# Patient Record
Sex: FEMALE | Race: WHITE
[De-identification: names, ages, dates, MRNs, and addresses within clinical notes are randomized per-mention and may not be internally consistent; named-entity substitution may affect disease eponyms.]

---

## 2019-01-26 ENCOUNTER — HOSPITAL ENCOUNTER (INPATIENT)
Dept: HOSPITAL 96 - M.ERS | Age: 44
LOS: 3 days | Discharge: HOME | DRG: 246 | End: 2019-01-29
Attending: INTERNAL MEDICINE | Admitting: INTERNAL MEDICINE
Payer: COMMERCIAL

## 2019-01-26 VITALS — DIASTOLIC BLOOD PRESSURE: 68 MMHG | SYSTOLIC BLOOD PRESSURE: 112 MMHG

## 2019-01-26 VITALS — SYSTOLIC BLOOD PRESSURE: 110 MMHG | DIASTOLIC BLOOD PRESSURE: 61 MMHG

## 2019-01-26 VITALS — DIASTOLIC BLOOD PRESSURE: 63 MMHG | SYSTOLIC BLOOD PRESSURE: 117 MMHG

## 2019-01-26 VITALS — DIASTOLIC BLOOD PRESSURE: 67 MMHG | SYSTOLIC BLOOD PRESSURE: 106 MMHG

## 2019-01-26 VITALS — SYSTOLIC BLOOD PRESSURE: 163 MMHG | DIASTOLIC BLOOD PRESSURE: 94 MMHG

## 2019-01-26 VITALS — DIASTOLIC BLOOD PRESSURE: 65 MMHG | SYSTOLIC BLOOD PRESSURE: 120 MMHG

## 2019-01-26 VITALS — SYSTOLIC BLOOD PRESSURE: 124 MMHG | DIASTOLIC BLOOD PRESSURE: 74 MMHG

## 2019-01-26 VITALS — SYSTOLIC BLOOD PRESSURE: 116 MMHG | DIASTOLIC BLOOD PRESSURE: 63 MMHG

## 2019-01-26 VITALS — DIASTOLIC BLOOD PRESSURE: 70 MMHG | SYSTOLIC BLOOD PRESSURE: 120 MMHG

## 2019-01-26 VITALS — DIASTOLIC BLOOD PRESSURE: 61 MMHG | SYSTOLIC BLOOD PRESSURE: 107 MMHG

## 2019-01-26 VITALS — DIASTOLIC BLOOD PRESSURE: 55 MMHG | SYSTOLIC BLOOD PRESSURE: 109 MMHG

## 2019-01-26 VITALS — WEIGHT: 204 LBS | BODY MASS INDEX: 38.51 KG/M2 | HEIGHT: 61 IN

## 2019-01-26 VITALS — DIASTOLIC BLOOD PRESSURE: 76 MMHG | SYSTOLIC BLOOD PRESSURE: 132 MMHG

## 2019-01-26 DIAGNOSIS — E03.9: ICD-10-CM

## 2019-01-26 DIAGNOSIS — M54.9: ICD-10-CM

## 2019-01-26 DIAGNOSIS — G89.29: ICD-10-CM

## 2019-01-26 DIAGNOSIS — I11.0: ICD-10-CM

## 2019-01-26 DIAGNOSIS — F41.1: ICD-10-CM

## 2019-01-26 DIAGNOSIS — F32.9: ICD-10-CM

## 2019-01-26 DIAGNOSIS — E66.9: ICD-10-CM

## 2019-01-26 DIAGNOSIS — Z90.49: ICD-10-CM

## 2019-01-26 DIAGNOSIS — I25.10: ICD-10-CM

## 2019-01-26 DIAGNOSIS — I21.4: Primary | ICD-10-CM

## 2019-01-26 DIAGNOSIS — I50.31: ICD-10-CM

## 2019-01-26 DIAGNOSIS — E11.9: ICD-10-CM

## 2019-01-26 DIAGNOSIS — Z71.6: ICD-10-CM

## 2019-01-26 DIAGNOSIS — E78.5: ICD-10-CM

## 2019-01-26 DIAGNOSIS — F17.210: ICD-10-CM

## 2019-01-26 DIAGNOSIS — Z82.49: ICD-10-CM

## 2019-01-26 DIAGNOSIS — Z79.899: ICD-10-CM

## 2019-01-26 LAB
ABSOLUTE BASOPHILS: 0.2 THOU/UL (ref 0–0.2)
ABSOLUTE EOSINOPHILS: 0.4 THOU/UL (ref 0–0.7)
ABSOLUTE MONOCYTES: 1.9 THOU/UL (ref 0–1.2)
ALBUMIN SERPL-MCNC: 3.7 G/DL (ref 3.4–5)
ALP SERPL-CCNC: 62 U/L (ref 46–116)
ALT SERPL-CCNC: 26 U/L (ref 30–65)
ANION GAP SERPL CALC-SCNC: 8 MMOL/L (ref 7–16)
AST SERPL-CCNC: 14 U/L (ref 15–37)
BASOPHILS NFR BLD AUTO: 1.1 %
BILIRUB SERPL-MCNC: 0.2 MG/DL
BUN SERPL-MCNC: 9 MG/DL (ref 7–18)
CALCIUM SERPL-MCNC: 9.4 MG/DL (ref 8.5–10.1)
CHLORIDE SERPL-SCNC: 101 MMOL/L (ref 98–107)
CO2 SERPL-SCNC: 26 MMOL/L (ref 21–32)
CREAT SERPL-MCNC: 0.7 MG/DL (ref 0.6–1.3)
EOSINOPHIL NFR BLD: 2.5 %
GLUCOSE SERPL-MCNC: 123 MG/DL (ref 70–99)
GRANULOCYTES NFR BLD MANUAL: 43.3 %
HCT VFR BLD CALC: 43 % (ref 37–47)
HGB BLD-MCNC: 14.8 GM/DL (ref 12–15)
INR PPP: 0.9
LIPASE: 152 U/L (ref 73–393)
LYMPHOCYTES # BLD: 7.7 THOU/UL (ref 0.8–5.3)
LYMPHOCYTES NFR BLD AUTO: 42.6 %
MCH RBC QN AUTO: 30.7 PG (ref 26–34)
MCHC RBC AUTO-ENTMCNC: 34.3 G/DL (ref 28–37)
MCV RBC: 89.5 FL (ref 80–100)
MONOCYTES NFR BLD: 10.5 %
MPV: 7.9 FL. (ref 7.2–11.1)
NEUTROPHILS # BLD: 7.8 THOU/UL (ref 1.6–8.1)
NT-PRO BRAIN NAT PEPTIDE: 21 PG/ML (ref ?–300)
NUCLEATED RBCS: 0 /100WBC
PLATELET COUNT*: 509 THOU/UL (ref 150–400)
POTASSIUM SERPL-SCNC: 3.8 MMOL/L (ref 3.5–5.1)
PROT SERPL-MCNC: 7.1 G/DL (ref 6.4–8.2)
PROTHROMBIN TIME: 9.6 SECONDS (ref 9.2–11.5)
RBC # BLD AUTO: 4.8 MIL/UL (ref 4.2–5)
RDW-CV: 13.1 % (ref 10.5–14.5)
SODIUM SERPL-SCNC: 135 MMOL/L (ref 136–145)
TROPONIN-I LEVEL: <0.06 NG/ML (ref ?–0.06)
WBC # BLD AUTO: 18.1 THOU/UL (ref 4–11)

## 2019-01-26 NOTE — EKG
[place] [address]
Phone:  [phone]                     ELECTROCARDIOGRAM REPORT      
_______________________________________________________________________________
 
Name:       HOWARD LONDONANNLIZBETH QUEZADA                 Room:           [address]    ADM [place]  
M.R.#:  [id number]      Account #:      [id number]  
Admission:  19     Attend Phys:    Conner Rodas MD, F
Discharge:               Date of Birth:  [birth year]  
         Report #: [id number]
    [id number]
_______________________________________________________________________________
THIS REPORT FOR:  //name//                      
 
                          [facility]
                                       
Test Date:    2019               Test Time:    10:37:37
Pat Name:     ROBERT LONDON             Department:   
Patient ID:   [id number]            Room:         [address]
Gender:       F                        Technician:   MINAL
:          1975               Requested By: Conner Rodas
Order Number: [id number]    Farideh BOURNE:   Erick Alexandra
                                 Measurements
Intervals                              Axis          
Rate:         79                       P:            28
NH:           163                      QRS:          78
QRSD:         92                       T:            66
QT:           387                                    
QTc:          444                                    
                           Interpretive Statements
Sinus rhythm
Borderline T abnormalities, anterior leads
Compared to ECG 2019 04:15:15
No significant changes
 
Electronically Signed On 2019 11:45:21 CST by Erick Alexandra
[email or web address]
 
 
 
 
 
 
 
 
 
 
 
 
 
 
 
 
 
 
  <ELECTRONICALLY SIGNED>
                                           By: Erick Alexandra MD, [facility]     
  19     1145
D: 19 1037   _____________________________________
T: 19 1037   Erick Alexandra MD, FACC       /EPI

## 2019-01-26 NOTE — EKG
[place] [address]
Phone:  [phone]                     ELECTROCARDIOGRAM REPORT      
_______________________________________________________________________________
 
Name:       ROBERT LONDON                 Room:           [address]    ADM IN  
M.R.#:  [id number]      Account #:      [id number]  
Admission:  19     Attend Phys:    Conner Rodas MD, F
Discharge:               Date of Birth:  [birth year]  
         Report #: [id number]
    [id number]
_______________________________________________________________________________
THIS REPORT FOR:  //name//                      
 
                         [facility] ED
                                       
Test Date:    2019               Test Time:    04:15:15
Pat Name:     ROBERT LONDON             Department:   
Patient ID:   [id number]            Room:         [facility]
Gender:       F                        Technician:   MARIS HUGHES
:          1975               Requested By: Libertad Carlin
Order Number: [id number] MD:   Conner Rodas
                                 Measurements
Intervals                              Axis          
Rate:         82                       P:            79
KS:           171                      QRS:          81
QRSD:         90                       T:            7
QT:           398                                    
QTc:          465                                    
                           Interpretive Statements
Sinus rhythm
Borderline T abnormalities, anterior leads
No previous ECG available for comparison
 
Electronically Signed On 2019 9:01:29 CST by Conner Rodas
[email or web address]
 
 
 
 
 
 
 
 
 
 
 
 
 
 
 
 
 
 
 
  <ELECTRONICALLY SIGNED>
                                           By: Conner Rodas MD, Located within [facility]      
  19     0901
D: 19 0415   _____________________________________
T: 19 0415   Conner Rodas MD, FACC        /EPI

## 2019-01-26 NOTE — NUR
VSS, ASSUMED CARE OF PT THIS AM, ASSESSMENT PERFOREMD AND CHARTED, FALL
PRECAUTIONS IN PLACE AND CALL LIGHT IN REACH, PT IS A&O4, UP AD BARRETT AND STSTES
PAIN IN LOWER BACK, PT IS TRACING SR WITH 1DBLK ON MONITOR, PT GOAL IS TO SIT
UP IN CHAIR WALK IN ROOM AND HAVE NO POST CATH COMPLICATIONS, WILL FOLLOW WITH
PLAN OF CARE.

## 2019-01-26 NOTE — CARD
[address]  [address]                    CARDIAC CATH REPORT           
_______________________________________________________________________________
 
Name:       ROBERT LONDON DAMIEN                 Room:           [address] [place]  
.R.#:  [id number]      Account #:      [id number]  
Admission:  01/26/19     Attend Phys:    Conner Rodas MD, F
Discharge:               Date of Birth:  [birth year]  
         Report #: [id number]
                                                                     [id number]
_______________________________________________________________________________
THIS REPORT FOR:  //name//                      
 
 
--------------- APPROVED REPORT --------------
 
 
Study performed:  01/26/2019 04:18:10
 
Patient Details
Patient Status: ED                  Room #: 
The patient is a 43 year-old female
 
Event Personnel
Marily German, Patricia Barber  RN, Panchito Mckeon (R) Adrianna Bryan David  Interventional Cardiologist
 
Procedures Performed
Art Access - R femoral artery*  Left Heart Cath w/or w/o Coronaries [id number] [facility] 
w/wo Plasty Single RCA [id number]
 
Indication
Abnormal ECG, Chest pain
 
Risk Factors
Hypercholesterolemia, Hypertension, Tobacco History ()
 
Admission/Lab Medications/Medications given during procedure
Glycoprotein IllbIlla Inhibitors, Platelet Aff. Inhib., Heparin Unfract.
 
Procedure Narrative
The patient was brought emergently to the Cardiac Catheterization Laboratory and
was 
prepped and draped in a sterile manner. The right femoral was infiltrated with
2% 
Lidocaine subcutaneous anesthesia. A 6fr Ultimum Sheath sheath was inserted into
the 
right femoral artery. Coronary angiography was performed using coronary
diagnostic 
catheters. The right coronary system was accessed and visualized with a
Diagnostic 
catheter. The left coronary system was accessed and visualized with a Diagnostic
catheter. The left ventricle was accessed and visualized with a Diagnostic
catheter. Left 
ventricular/Aortic Valve gradient assessed via catheter pullback. Left
 
 
 
[place] [address]                    CARDIAC CATH REPORT           
_______________________________________________________________________________
 
Name:       LONDONRBOERT R                 Room:           [address] [place]  
[facility]#:  [id number]      Account #:      [id number]  
Admission:  01/26/19     Attend Phys:    Conner Rodas MD, F
Discharge:               Date of Birth:  [birth year]  
         Report #: [id number]
                                                                     [id number]
_______________________________________________________________________________
ventriculogram was 
performed in CULVER projection. Pre-demployment femoral angiogram was performed .
Closure 
device was deployed with a 6 Fr Angioseal STS 6Fr. The patient tolerated the
procedure 
well and there were no complications associated with the procedure. There was no
hematoma.
 
Intraoperative Conscious Sedation
Sedation start time:  0451           Case end Time:  0524    
Fentanyl  50 mcg    Versed  2 mg  
 
Dose:        1039 mGy  
Contrast Type and Amount:  Visipaque 230 ml    
 
Coronary Angiography
The patient's coronary anatomy is right dominant. 
 
Diagnostic Cath
Left Main 0% stenosis
LAD  90% mid stenosis
Circumflex 0% stenosis
Right Coronary 80% mid stenosis
 
Left Ventriculography
The left ventricle is normal in size with normal contractility. The left
ventricular 
ejection fraction is estimated to be 60-65%. Left ventricular wall motion
abnormalities 
are not present. There is no mitral insufficiency.
 
Hemodynamics
The aortic pressure is 131/63 mmHg with a mean of 96 mmHg. The left ventricular
pressure 
is 171/20 mmHg with a mean of mmHg. The left ventricular end diastolic pressure
is 30 
mmHg. There was no gradient across the aortic valve upon pullback. Pullback from
the left 
ventricle to the aorta revealed no gradient across the aortic valve.
 
PCI Technique Lesion
Anticoagulation was achieved with Heparin. bolus of iv aggrastat given
Percutaneous 
coronary intervention was performed on the mid right coronary artery. The lesion
stenosis 
prior to intervention was 80% with DEISY 3 flow. A 6F 3DRC Guide Catheter was
 
 
 
[place] [address]                    CARDIAC CATH REPORT           
_______________________________________________________________________________
 
Name:       ROBERT LONDON                 Room:           [address] [place]  
..#:  [id number]      Account #:      [id number]  
Admission:  01/26/19     Attend Phys:    Conner Rodas MD, F
Discharge:               Date of Birth:  [birth year]  
         Report #: [id number]
                                                                     [id number]
_______________________________________________________________________________
used to 
engage the rca ostium. A BMW 190cm Interventional Guidewire was used to cross
the 
lesion.
 
BALLOON DILATION
A Balloon catheter Trek RX 2.5 X 8 was inserted and inflated up to 12.00atm for 
16seconds. Repeat angiography revealed the following post-dilatation results:
50% 
stenosis with small dissection.
 
STENT DEPLOYMENT
A drug-eluting stent Xience Catina 2.5X15mm was inserted and inflated up to
7.00atm for 
12seconds. Repeat angiography revealed the following post-stent deployment
results: 0% 
stenosis. Additional Inflation: 8.00atm for 11seconds. Additional Inflation:
12.00atm for 
15seconds. additional inflation: 14.00 nyla for 12 seconds
 
Final angiography reveals 0 % stenosis with DEISY 3 flow.
 
Conclusion
1.  90% stenosis noted of the mid lad and 80% stenosis of the mid rca
2.  normal LV function
3.  successful placement of a single drug eluting stent in the mid rca 
 
Recommendations
plan return to lad at a later date for stenting of the lad
 
Medications Administered
Ticagrelor
 
 
 
 
 
 
 
 
 
 
 
 
<ELECTRONICALLY SIGNED>
                                        By:  Conner Rodas MD, [facility]      
01/26/19 0838
D: 01/26/19 0838_______________________________________
T: 01/26/19 0838Danicola Rodas MD, [facility]         /INF

## 2019-01-27 VITALS — SYSTOLIC BLOOD PRESSURE: 117 MMHG | DIASTOLIC BLOOD PRESSURE: 61 MMHG

## 2019-01-27 VITALS — DIASTOLIC BLOOD PRESSURE: 83 MMHG | SYSTOLIC BLOOD PRESSURE: 140 MMHG

## 2019-01-27 VITALS — DIASTOLIC BLOOD PRESSURE: 49 MMHG | SYSTOLIC BLOOD PRESSURE: 139 MMHG

## 2019-01-27 VITALS — SYSTOLIC BLOOD PRESSURE: 118 MMHG | DIASTOLIC BLOOD PRESSURE: 74 MMHG

## 2019-01-27 VITALS — SYSTOLIC BLOOD PRESSURE: 117 MMHG | DIASTOLIC BLOOD PRESSURE: 56 MMHG

## 2019-01-27 VITALS — SYSTOLIC BLOOD PRESSURE: 107 MMHG | DIASTOLIC BLOOD PRESSURE: 59 MMHG

## 2019-01-27 LAB
ANION GAP SERPL CALC-SCNC: 1 MMOL/L (ref 7–16)
BUN SERPL-MCNC: 8 MG/DL (ref 7–18)
CALCIUM SERPL-MCNC: 8.4 MG/DL (ref 8.5–10.1)
CHLORIDE SERPL-SCNC: 99 MMOL/L (ref 98–107)
CHOLEST SERPL-MCNC: 207 MG/DL (ref ?–200)
CO2 SERPL-SCNC: 25 MMOL/L (ref 21–32)
CREAT SERPL-MCNC: 0.9 MG/DL (ref 0.6–1.3)
EST. AVERAGE GLUCOSE BLD GHB EST-MCNC: 117 MG/DL
GLUCOSE SERPL-MCNC: 103 MG/DL (ref 70–99)
GLYCOHEMOGLOBIN (HGB A1C): 5.7 % (ref 4.8–5.6)
HCT VFR BLD CALC: 40.4 % (ref 37–47)
HDLC SERPL-MCNC: 32 MG/DL (ref 40–?)
HGB BLD-MCNC: 13.6 GM/DL (ref 12–15)
LDLC SERPL-MCNC: 140 MG/DL (ref ?–100)
MCH RBC QN AUTO: 30.8 PG (ref 26–34)
MCHC RBC AUTO-ENTMCNC: 33.6 G/DL (ref 28–37)
MCV RBC: 91.5 FL (ref 80–100)
MPV: 7.5 FL. (ref 7.2–11.1)
PLATELET COUNT*: 401 THOU/UL (ref 150–400)
POTASSIUM SERPL-SCNC: 3.5 MMOL/L (ref 3.5–5.1)
RBC # BLD AUTO: 4.42 MIL/UL (ref 4.2–5)
RDW-CV: 13.1 % (ref 10.5–14.5)
SERUM ASSESSMENT: CLEAR
SODIUM SERPL-SCNC: 125 MMOL/L (ref 136–145)
TC:HDL: 6.5 RATIO
TRIGL SERPL-MCNC: 176 MG/DL (ref ?–150)
TROPONIN-I LEVEL: 2.44 NG/ML (ref ?–0.06)
VLDLC SERPL CALC-MCNC: 35 MG/DL (ref ?–40)
WBC # BLD AUTO: 11.8 THOU/UL (ref 4–11)

## 2019-01-27 NOTE — NUR
NO CHANGES THIS SHIFT. PAIN MANAGED WITH PO PAIN MEDICATION WITH RELIEF. PT
ABLE TO REST THIS AFTERNOON. FAMILY AT BEDSIDE. NPO AFTER MIDNIGHT FOR CATH
TOMORROW. NO COMPLAINTS AT THIS TIME. CALL LIGHT IS WITHIN REACH.

## 2019-01-27 NOTE — NUR
RECEIVED REPORT FROM NIGHT NURSE. ASSUMED CARE OF PT AROUND 0730. PT A&O X4,
PLEASANT. VSS. CARDIAC MONITOR IN PLACE TRACING SR. DENIES CHEST PAIN. AM
ASSESSMENT COMPLETED AS CHARTED. PT UP TO BEDSIDE CHAIR FOR BREAKFAST.
TOLERATING DIET. REPORTED BACK AND NECK PAIN - RECEIVED PRN PAIN PILL WITH
RELIEF. NO CONCERNS VOICED AT THIS TIME. FALL PRECAUTIONS IN PLACE. CALL LIGHT
IS WITHIN REACH. WILL CONTINUE WITH PLAN OF CARE.

## 2019-01-27 NOTE — EKG
[place] [address]
Phone:  [phone]                     ELECTROCARDIOGRAM REPORT      
_______________________________________________________________________________
 
Name:       ROBERT LONDON                 Room:           [address]    ADM IN  
.R.#:  [id number]      Account #:      [id number]  
Admission:  19     Attend Phys:    Conner Rodas MD, F
Discharge:               Date of Birth:  [birth year]  
         Report #: [id number]
    [id number]
_______________________________________________________________________________
THIS REPORT FOR:  //name//                      
 
                          [facility]
                                       
Test Date:    2019               Test Time:    04:30:14
Pat Name:     ROBERT LONDON             Department:   
Patient ID:   [id number]            Room:         [facility]
Gender:       F                        Technician:   [facility]
:          1975               Requested By: Conner Rodas
Order Number: [id number]    Farideh MD:   Conner Rodas
                                 Measurements
Intervals                              Axis          
Rate:         79                       P:            28
ND:           160                      QRS:          69
QRSD:         89                       T:            17
QT:           389                                    
QTc:          447                                    
                           Interpretive Statements
Sinus rhythm
Compared to ECG 2019 10:37:37
T-wave abnormality no longer present
 
Electronically Signed On 2019 12:17:19 CST by Conner Rodas
[email or web address]
 
 
 
 
 
 
 
 
 
 
 
 
 
 
 
 
 
 
 
  <ELECTRONICALLY SIGNED>
                                           By: Conner Rodas MD, [facility]      
  19     1217
D: 19 0430   _____________________________________
T: 19 0430   Conner Rodas MD, FACC        /EPI

## 2019-01-27 NOTE — NUR
RECEIVED REPORT FROM ICU RN ANTON AT 1935. PT ARRIVED TO UNIT AT 2000 VIA
WHEELCHAIR. PT AAOX4. SPOUSE AT BEDSIDE. CARDIAC MONITOR IN PLACE, TRACING
SINUS RHYTHM. DENIES CHEST PAIN THIS SHIFT. HOURLY ROUNDING COMPLETED. CALL
LIGHT WITHIN REACH. VOICED NO CONCERNS THIS SHIFT.

## 2019-01-27 NOTE — EKG
[place] [address]
Phone:  [phone]                     ELECTROCARDIOGRAM REPORT      
_______________________________________________________________________________
 
Name:       ROBERT LONDON                 Room:           [address]    ADM IN  
.R.#:  [id number]      Account #:      [id number]  
Admission:  19     Attend Phys:    Conner Rodas MD, F
Discharge:               Date of Birth:  [birth year]  
         Report #: [id number]
    [id number]
_______________________________________________________________________________
THIS REPORT FOR:  //name//                      
 
                         [facility] ED
                                       
Test Date:    2019               Test Time:    04:16:29
Pat Name:     ROBERT LONDON             Department:   
Patient ID:   [id number]            Room:         [facility]
Gender:       F                        Technician:   MARIS HUGHES
:          1975               Requested By: Libertad Carlin
Order Number: [id number]    Farideh MD:   Conner Rodas
                                 Measurements
Intervals                              Axis          
Rate:         82                       P:            74
SC:           167                      QRS:          78
QRSD:         98                       T:            2
QT:           405                                    
QTc:          473                                    
                           Interpretive Statements
Sinus rhythm
Borderline T abnormalities, inferior leads
 
 
Electronically Signed On 2019 9:01:48 CST by Conner Rodas
Compared to ECG 2019 04:15:15
No significant changes
 
Electronically Signed On 2019 12:10:25 CST by Conner Rodas
[email or web address]
 
 
 
 
 
 
 
 
 
 
 
 
 
 
 
  <ELECTRONICALLY SIGNED>
                                           By: Conner Rodas MD, [facility]      
  19     1210
D: 19 0416   _____________________________________
T: 19 0416   Conner Rodas MD, [facility]        /EPI

## 2019-01-28 VITALS — DIASTOLIC BLOOD PRESSURE: 59 MMHG | SYSTOLIC BLOOD PRESSURE: 110 MMHG

## 2019-01-28 VITALS — DIASTOLIC BLOOD PRESSURE: 70 MMHG | SYSTOLIC BLOOD PRESSURE: 112 MMHG

## 2019-01-28 VITALS — SYSTOLIC BLOOD PRESSURE: 139 MMHG | DIASTOLIC BLOOD PRESSURE: 78 MMHG

## 2019-01-28 VITALS — SYSTOLIC BLOOD PRESSURE: 112 MMHG | DIASTOLIC BLOOD PRESSURE: 70 MMHG

## 2019-01-28 VITALS — DIASTOLIC BLOOD PRESSURE: 61 MMHG | SYSTOLIC BLOOD PRESSURE: 112 MMHG

## 2019-01-28 VITALS — SYSTOLIC BLOOD PRESSURE: 144 MMHG | DIASTOLIC BLOOD PRESSURE: 76 MMHG

## 2019-01-28 VITALS — SYSTOLIC BLOOD PRESSURE: 126 MMHG | DIASTOLIC BLOOD PRESSURE: 72 MMHG

## 2019-01-28 VITALS — SYSTOLIC BLOOD PRESSURE: 128 MMHG | DIASTOLIC BLOOD PRESSURE: 87 MMHG

## 2019-01-28 VITALS — DIASTOLIC BLOOD PRESSURE: 67 MMHG | SYSTOLIC BLOOD PRESSURE: 116 MMHG

## 2019-01-28 VITALS — DIASTOLIC BLOOD PRESSURE: 72 MMHG | SYSTOLIC BLOOD PRESSURE: 113 MMHG

## 2019-01-28 VITALS — SYSTOLIC BLOOD PRESSURE: 127 MMHG | DIASTOLIC BLOOD PRESSURE: 76 MMHG

## 2019-01-28 VITALS — SYSTOLIC BLOOD PRESSURE: 111 MMHG | DIASTOLIC BLOOD PRESSURE: 66 MMHG

## 2019-01-28 VITALS — DIASTOLIC BLOOD PRESSURE: 72 MMHG | SYSTOLIC BLOOD PRESSURE: 126 MMHG

## 2019-01-28 VITALS — DIASTOLIC BLOOD PRESSURE: 80 MMHG | SYSTOLIC BLOOD PRESSURE: 129 MMHG

## 2019-01-28 NOTE — NUR
MET WITH PT TO DISCUSS HOME SITUATION/DC PLANNING. PT LIVES WITH SPOUSE AND
CHILDREN. SHE IS INDEPENDENT AND ACTIVE.  USES NO EQUIPMENT.  PLANS TO RETURN
HOME AT TOMORROW. PT'S MOTHER PASSED AWAY AND PT WANTS TO BE DC'D AS EARLY AS
POSSIBLE TOMORROW FOR .  SUPPORT GIVEN  NURSING AWARE

## 2019-01-28 NOTE — CARD
[address]  [address]                    CARDIAC CATH REPORT           
_______________________________________________________________________________
 
Name:       ROBERT LONDON DAMIEN                 Room:           [address] [place]  
.R.#:  [id number]      Account #:      [id number]  
Admission:  01/26/19     Attend Phys:    Conner Rodas MD, F
Discharge:               Date of Birth:  [birth year]  
         Report #: [id number]
                                                                     [id number]
_______________________________________________________________________________
THIS REPORT FOR:  //name//                      
 
 
--------------- ADDENDUM APPROVED REPORT --------------
 
 
Study performed:  01/28/2019 09:48:09
 
Patient Details
Patient Status: In-Patient                  Room #: 
The patient is a 43 year-old female
 
Event Personnel
Conner Rodas  Cardiologist, Paula Vaca RN RN, Ceasar Mckee,
Panchito Mckeon (Martir Jennings Brittany RN RN
 
Procedures Performed
cath pci
 
Indication
Chest pain
 
Risk Factors
Arterial Hypertension, Hypercholesterolemia, Tobacco History ()
 
Previous Procedures/Diagnoses
Previous PCI
 
Admission/Lab Medications/Medications given during procedure
Heparin Unfract.
 
Procedure Narrative
The patient was brought electively to the Cardiac Catheterization Laboratory and
was 
prepped and draped in a sterile manner. The right wrist was infiltrated with 1%
Lidocaine 
subcutaneous anesthesia. A Slender Glidesheath sheath was inserted into the
right radial 
artery. Coronary angiography was performed using coronary diagnostic catheters.
The right 
coronary system was accessed and visualized with a JR4 5fr catheter. The left
coronary 
system was accessed and visualized with a 6FR XB 3.0 100CM catheter. The left
ventricle 
was accessed and visualized with a PJR4 5fr/PRESSURES ONLY catheter. Left 
 
 
 
[place] [address]                    CARDIAC CATH REPORT           
_______________________________________________________________________________
 
Name:       ROBERT LONDON                 Room:           [address] [place]  
.R.#:  [id number]      Account #:      [id number]  
Admission:  01/26/19     Attend Phys:    Conner Rodas MD, F
Discharge:               Date of Birth:  [birth year]  
         Report #: [id number]
                                                                     [id number]
_______________________________________________________________________________
ventricular/Aortic Valve gradient assessed via catheter pullback. Closure device
was 
deployed with a 6 Fr Vasc-Band Reg 24cm. The patient tolerated the procedure
well and 
there were no complications associated with the procedure. There was no 
hematoma.
 
Intraoperative Conscious Sedation
Sedation start time:  1056           Case end Time:  1126    
Fentanyl  25 mcg    Versed  2 mg  
 
Fluoro Time:    5.5 minutes    
Dose:     DAP 99092 cGycm2  930 mGy  
Contrast Type and Amount:  Omnipaque 150 ml   
 
Coronary Angiography
The patient's coronary anatomy is right dominant. 
 
Diagnostic Cath
Left Main 0% stenosis
LAD  90% mid stenosis, 60% distal stenosis
Circumflex 0% stenosis
Right Coronary stent in mid rca with 0% stenosis
 
Left Ventriculography
Left Ventriculography was not performed.     
 
Hemodynamics
The aortic pressure is 111/65 mmHg with a mean of 84 mmHg. The left ventricular
pressure 
is 111/20 mmHg with a mean of mmHg. The left ventricular end diastolic pressure
is 22 
mmHg. There was no gradient across the aortic valve upon pullback. Pullback from
the left 
ventricle to the aorta revealed no gradient across the aortic valve.
 
PCI Technique Lesion
Anticoagulation was achieved with Heparin. Patient was preloaded with Brillinta.
Percutaneous coronary intervention was performed on the mid left anterior
descending 
artery segment. The lesion stenosis prior to intervention was 90% with DEISY 3
flow. A 6FR 
XB 3.0 100CM Guide Catheter was used to engage the lm ostium. A IG: BMW 190cm 
Interventional Guidewire was used to cross the lesion.
 
BALLOON DILATION
 
 
 
[place] [address]                    CARDIAC CATH REPORT           
_______________________________________________________________________________
 
Name:       ROBERT LONDON                 Room:           [address] [place]  
..#:  [id number]      Account #:      [id number]  
Admission:  01/26/19     Attend Phys:    Conner Rodas MD, F
Discharge:               Date of Birth:  [birth year]  
         Report #: [id number]
                                                                     [id number]
_______________________________________________________________________________
A Balloon catheter Mini Trek RX 2.0 X 8 was inserted and inflated up to 8.00atm
for 
13seconds. Repeat angiography revealed the following post-dilatation results:
50% 
stenosis. Additional Inflation: 10.00atm for 13seconds.
 
STENT DEPLOYMENT
A drug-eluting stent Xience Catina 2.04H55oq was inserted and inflated up to
8.00atm for 
8seconds. Repeat angiography revealed the following post-stent deployment
results: 0% 
stenosis. Additional Inflation: 8.00atm for 7seconds. Additional Inflation:
8.00atm for 
9seconds. 12 MARGUERITE FOR 16 SEC 15 MARGUERITE FOR 17 SEC 16 MARGUERITE FOR 14 SEC
 
Final angiography reveals 0 % stenosis with DEISY 3 flow.
 
Conclusion
1.  90% stenosis in mid lad
2.   successful placement of a drug eluting stent in the mid lad
3.  no restenosis of stent in the mid rca 
 
Recommendations
Smoking Cessation
Cardiac Rehabilitation Referral
Aggressive Medical Therapy
 
Medications Administered
Ticagrelor
 
 
 
 
 
 
 
 
 
 
 
 
 
 
 
<ELECTRONICALLY SIGNED>
                                        By:  Conner Rodas MD, [facility]      
01/28/19     1745
D: 01/28/19 1745_______________________________________
T: 01/28/19 1745Danicola Rodas MD, [facility]         /INF

## 2019-01-28 NOTE — NUR
ASSUMED CARE OF PT AFTER REPORT AT 1930. PT A&OX4. VSS. PHYSICAL ASSESSMENT
COMPLETED AND CHARTED. PT ON RA WITH 99% O2 SAT. PT TRACING SR ON TELE. PT UP
ADLIB TO RESTROOM. POST CATH SITE TO RIGHT GROIN C/D/I. NO HEMATOMA. PT C/O OF
RIGHT GROIN & CHRONIC BACK PAIN-PAIN MEDS GIVEN PER MAR. REMINDED PT TO BE NPO
POST MIDNIGHT FOR SECOND CARDIAC CATH TODAY. COMMUNICATES UNDERSTANDING. PT
RESTED WELL ON BED. CALL LIGHT WITHIN REACH. BED IN LOW POSITION.

## 2019-01-28 NOTE — H
[address]  [address]                    HISTORY AND PHYSICAL          
_______________________________________________________________________________
 
Name:       ROBERT LONDON                 Room:           [address] [place]  
M.R.#:  [id number]      Account #:      [id number]  
Admission:  01/26/19     Attend Phys:    Conner Rodas MD, F
Discharge:               Date of Birth:  [birth year]  
         Report #: [id number]
                                                                     [id number]  
_______________________________________________________________________________
THIS REPORT FOR:  //name//                      
 
CC: Kristian Landago
 
DATE OF SERVICE:  01/26/2019
 
 
HISTORY OF PRESENT ILLNESS:  The patient is a 43-year-old  white female
who I was asked to see in the Emergency Room after she complained of chest pain.
 The patient has no previous history of heart disease.  She notes she was doing
well until this morning at approximately 4:00 a.m., she woke up with a pain in
her chest.  She described a sharp pain that went into her back.  Her arms seem
to burn, she felt diaphoretic and nauseated.  She called EMS and was brought
here.  ECG showed nonspecific ST and T-wave changes.  A code STEMI was
activated.  She denied the pain being related to food.  She has had no recent
fever or bleeding.  She denied any trauma to her chest.  There is no rash.  She
denies exertional dyspnea.  She does note occasional flutter in her heart, but
no syncope.
 
PAST MEDICAL HISTORY:  She has had previous cholecystectomy, bladder surgery,
hypertension.
 
MEDICATIONS:  Include amlodipine, Synthroid.  She uses inhaler, Celexa,
hydrocodone for chronic back pain.
 
ALLERGIES:  She has no known drug allergies.
 
FAMILY HISTORY:  Her father and mother both had stents.
 
SOCIAL HISTORY:  She is .  She and her  live in [place].  She
works as a .  Smokes a pack of cigarettes a day.  No alcohol use.
 
REVIEW OF SYSTEMS:  She has had no history of stroke, peptic ulcer disease,
liver disease, kidney disease, cancer, psychiatric illness, chronic skin
condition.
 
PHYSICAL EXAMINATION:
GENERAL:  Revealed a young white female lying in bed.  She appeared in no
distress.
VITAL SIGNS:  She had a blood pressure of 160/90, pulse 90.
HEENT:  She is anicteric, conjunctiva pink.  Mucous membranes moist.
NECK:  Veins difficult to assess due to obesity.  No carotid bruits heard.
CHEST:  Clear to auscultation.
HEART:  Regular rate and rhythm.
 
 
 
[place] [address]                    HISTORY AND PHYSICAL          
_______________________________________________________________________________
 
Name:       ROBERT LONDON                 Room:           [address]#:  [id number]      Account #:      [id number]  
Admission:  01/26/19     Attend Phys:    Conner Rodas MD, F
Discharge:               Date of Birth:  [birth year]  
         Report #: [id number]
                                                                     [id number]  
_______________________________________________________________________________
ABDOMEN:  Obese, soft, nontender.
EXTREMITIES:  Had no edema.  Dorsalis pedis pulse 2+ bilaterally.
SKIN:  Warm, dry.
NEUROLOGIC:  Nonfocal.
 
DIAGNOSTIC DATA:  ECG, sinus rhythm, nonspecific ST and T-wave change.  Of note,
she had an ECG done by Paramedics, did appear to show ST segment elevation in
lead 3.
 
LABORATORY DATA:  Pending except white blood cell count is 14.1, hemoglobin
14.8, hematocrit 43.
 
IMPRESSION AND RECOMMENDATIONS:
1.  Possible acute coronary syndrome.  Recommend urgent cardiac catheterization.
2.  Hypertension.  The patient is on amlodipine.
3.  Chronic back pain.
4.  Tobacco abuse.
 
 
 
 
 
 
 
 
 
 
 
 
 
 
 
 
 
 
 
 
 
 
 
 
 
 
 
<ELECTRONICALLY SIGNED>
                                        By:  Conner Rodas MD, FACC      
01/28/19     1551
D: 01/26/19 0440_______________________________________
T: 01/26/19 0513Danicola Rodas MD, FACC         /nt

## 2019-01-28 NOTE — EKG
[place] [address]
Phone:  [phone]                     ELECTROCARDIOGRAM REPORT      
_______________________________________________________________________________
 
Name:       ROBERT LONDON                 Room:           [address]    ADM [place]  
M.R.#:  [id number]      Account #:      [id number]  
Admission:  19     Attend Phys:    Conner Rodas MD, F
Discharge:               Date of Birth:  [birth year]  
         Report #: [id number]
    [id number]
_______________________________________________________________________________
THIS REPORT FOR:  //name//                      
 
                          [facility]
                                       
Test Date:    2019               Test Time:    12:47:48
Pat Name:     ROBERT LONDON             Department:   
Patient ID:   [id number]            Room:         [address]
Gender:       F                        Technician:   
:          1975               Requested By: Conner Rodas
Order Number: [id number]    Farideh MD:   Conner Rodas
                                 Measurements
Intervals                              Axis          
Rate:         69                       P:            29
ID:           [id number]                      QRS:          52
QRSD:         82                       T:            18
QT:           399                                    
QTc:          428                                    
                           Interpretive Statements
Sinus rhythm
Compared to ECG 2019 04:30:14
No significant changes
 
Electronically Signed On 2019 15:00:14 CST by Conner Rodas
[email or web address]
 
 
 
 
 
 
 
 
 
 
 
 
 
 
 
 
 
 
 
  <ELECTRONICALLY SIGNED>
                                           By: Conner Rodas MD, [facility]      
  19     1500
D: 19 1247   _____________________________________
T: 19 124   Conner Rodas MD, FACC        /EPI

## 2019-01-29 VITALS — SYSTOLIC BLOOD PRESSURE: 123 MMHG | DIASTOLIC BLOOD PRESSURE: 85 MMHG

## 2019-01-29 VITALS — SYSTOLIC BLOOD PRESSURE: 120 MMHG | DIASTOLIC BLOOD PRESSURE: 65 MMHG

## 2019-01-29 LAB
ANION GAP SERPL CALC-SCNC: 8 MMOL/L (ref 7–16)
BUN SERPL-MCNC: 10 MG/DL (ref 7–18)
CALCIUM SERPL-MCNC: 8.9 MG/DL (ref 8.5–10.1)
CHLORIDE SERPL-SCNC: 102 MMOL/L (ref 98–107)
CO2 SERPL-SCNC: 26 MMOL/L (ref 21–32)
CREAT SERPL-MCNC: 0.9 MG/DL (ref 0.6–1.3)
GLUCOSE SERPL-MCNC: 104 MG/DL (ref 70–99)
HCT VFR BLD CALC: 38.3 % (ref 37–47)
HGB BLD-MCNC: 13 GM/DL (ref 12–15)
MCH RBC QN AUTO: 31.1 PG (ref 26–34)
MCHC RBC AUTO-ENTMCNC: 34 G/DL (ref 28–37)
MCV RBC: 91.4 FL (ref 80–100)
MPV: 8 FL. (ref 7.2–11.1)
PLATELET COUNT*: 358 THOU/UL (ref 150–400)
POTASSIUM SERPL-SCNC: 3.9 MMOL/L (ref 3.5–5.1)
RBC # BLD AUTO: 4.19 MIL/UL (ref 4.2–5)
RDW-CV: 12.8 % (ref 10.5–14.5)
SODIUM SERPL-SCNC: 136 MMOL/L (ref 136–145)
TROPONIN-I LEVEL: 1.32 NG/ML (ref ?–0.06)
WBC # BLD AUTO: 12.1 THOU/UL (ref 4–11)

## 2019-01-29 NOTE — D
[address]  [address]                    DISCHARGE SUMMARY             
_______________________________________________________________________________
 
Name:       ROBERT LONDON                 Room:           [address] [place]  
M.R.#:  [id number]      Account #:      [id number]  
Admission:  01/26/19     Attend Phys:    Conner Rodas MD, F
Discharge:  01/29/19     Date of Birth:  [birth year]  
         Report #: [id number]
                                                                     [id number]  
_______________________________________________________________________________
THIS REPORT FOR:  //name//                      
 
CC: Kristian Suarez
 
DATE OF SERVICE:  01/29/2019
 
 
DISCHARGE DIAGNOSES:
1.  Non-ST segment elevation myocardial infarction.
2.  Coronary artery disease.
3.  Hypertension.
4.  Hyperlipidemia.
5.  Tobacco abuse.
 
CONSULTANTS:  None.
 
PROCEDURES:
1.  Emergent left heart catheterization with placement of a drug-eluting stent
in the right coronary artery via the femoral approach.
2.  Elective cardiac catheterization with placement of a drug-eluting stent in
the LAD via the right radial artery.
 
HISTORY OF PRESENT ILLNESS:  The patient is a 43-year-old  white female
who came to the Emergency Room complaining of chest pain.  The patient was doing
well until the morning of admission at 4:00 a.m. she woke up with a pain in her
chest, went into her back, her arms, seemed to burn, she felt diaphoretic and
nauseated.  She called paramedics.  ECG showed ST segment elevation apparently
in lead 3.  A code STEMI was activated.  I was asked to see her on an emergent
basis.  On arrival to the Emergency Room, her pain had improved.  She denied any
recent shortness of breath, bleeding, trauma to her chest or rash.  She notes
occasional flutter in her chest, but no syncope.
 
PAST MEDICAL HISTORY:  She has had previous cholecystectomy, bladder surgery. 
She has a history of hypertension and hyperlipidemia.
 
MEDICATIONS:  Included amlodipine, Lipitor, Synthroid, an inhaler, Celexa,
hydrocodone for chronic back pain.
 
ALLERGIES:  She has no known drug allergies.
 
SOCIAL HISTORY:  She is .  She lives in [place] with her .  She
works as a .  Smokes a pack of cigarettes a day.
 
PHYSICAL EXAMINATION:
 
 
 
[place] [address]                    DISCHARGE SUMMARY             
_______________________________________________________________________________
 
Name:       ROBERT LONDON                 Room:           [address]#:  [id number]      Account #:      [id number]  
Admission:  01/26/19     Attend Phys:    Conner Rodas MD, F
Discharge:  01/29/19     Date of Birth:  [birth year]  
         Report #: [id number]
                                                                     [id number]  
_______________________________________________________________________________
GENERAL:  Revealed a young female.
VITAL SIGNS:  Blood pressure 160/90, pulse is 90.
CHEST:  Clear to auscultation.
CARDIAC:  Regular rate and rhythm.
ABDOMEN:  Soft.
EXTREMITIES:  No edema.
SKIN:  Warm and dry.
NEUROLOGIC:  Nonfocal.
 
LABORATORY DATA:  Her ECG done by paramedics did show ST segment elevation in
lead 3.  However, on arrival to the Emergency Room at [place], ECG showed
only nonspecific ST and T-wave changes.  Lab work included hemoglobin of 14.8.
 
HOSPITAL COURSE:   Because of the transient ST segment elevation, the patient
was taken urgently to cardiac catheterization lab and I performed cardiac
catheterization from the right femoral artery.  Results showed ejection fraction
of 60%.  There was an 80% narrowing of the mid right coronary artery that was
somewhat eccentric.  There was also a 90% discrete stenosis in mid LAD.  She was
given heparin and Aggrastat.  I then placed a single drug-eluting stent in the
mid right coronary artery since this is where the transient ST segment elevation
was noted.  Fortunately, she had no further chest pain, arrhythmias or heart
failure.  She had no significant hematoma in the groin after an Angio-Seal was
placed.  She began to ambulate with cardiac rehabilitation.  Because of the
severe stenosis in mid LAD, I took her back to the cardiac catheterization lab
on Monday, 01/28/2019.  I performed repeat cardiac catheterization from the
right radial artery.  There was no significant restenosis of the stent in the
right coronary artery.  I then placed a single drug-eluting stent in the mid
LAD.  She tolerated this well.  The patient was placed on Brilinta.  Workup
during her hospitalization included a chest x-ray that showed normal heart size
and clear lung fields.  Additional lab work included BUN 10, creatinine 0.9,
fasting blood sugar 104.  Liver function studies were normal.  Her peak troponin
was 2.44.  Cholesterol was 207, triglyceride 176, HDL 32, .  TSH was 5.4.
 Glycosylated hemoglobin was 5.7.  A followup hemoglobin was 13 and there was no
drop in platelet count.
 
The patient was discharged on her home medication that included amlodipine 10 mg
a day for hypertension, Celexa 40 mg a day, hydrocodone for chronic back pain,
Synthroid for hypothyroidism.  She was placed on aspirin 81 mg a day, Brilinta
90 mg twice a day.  She was given nitroglycerin to take as needed for chest
pain.  Because of her coronary artery disease and non-STEMI she was started on
metoprolol 25 mg twice a day.  She was discharged to return to care of Dr. Luther
for routine medical care.  She is scheduled to see my nurse practitioner in 1
week.  I plan on seeing her in Cardiology Clinic in 2 months.  She is felt to
have a good prognosis from a cardiac standpoint.  I have recommended she not
return to work for a week.  I did recommend she enroll in cardiac rehabilitation
at [facility].  At the time of discharge, there was no significant hematoma in
 
 
 
[address]  [address]                    DISCHARGE SUMMARY             
_______________________________________________________________________________
 
Name:       ROBERT LONDON                 Room:           [address] [place]  
M.R.#:  [id number]      Account #:      [id number]  
Admission:  01/26/19     Attend Phys:    Conner Rodas MD, F
Discharge:  01/29/19     Date of Birth:  [birth year]  
         Report #: [id number]
                                                                     [id number]  
_______________________________________________________________________________
the right groin or right wrist.  She was given a prescription for the Brilinta
to take 90 mg every 12 hours for at least one year following placement of the
stent.
 
 
 
 
 
 
 
 
 
 
 
 
 
 
 
 
 
 
 
 
 
 
 
 
 
 
 
 
 
 
 
 
 
 
 
 
 
 
 
 
 
<ELECTRONICALLY SIGNED>
                                        By:  Conner Rodas MD, FAC      
01/29/19     1655
D: 01/29/19 1032_______________________________________
T: 01/29/19 1053David AZALEA Rodas MD, FACC         /nt

## 2019-01-29 NOTE — NUR
ORDER RECEIVED TO DISCHARGE PATINET HOME TO SELF CARE. MED REC, MEDICATION
EDUCATION, STROKE EDUCATION, AND NEED FOR FOLLOW UP APPOINTMETNS COVERED AND
STATED AS UNDERSTOOD BY PATIENT. TELEMRTY AND IV DC'D. HOURLY ROUNDING
COMPLETD FOR PATIENT SAFETY. DISCHRAGE TOME OF 11:45. PATINET ESCORTED BY
HOSITAL STAFF TO AWAITING CAR WITH SPOUSE PRESENT.

## 2019-01-29 NOTE — NUR
ASSUMED CARE OF PT AFTER REPORT AT 1930. PT A&OX4. VSS. PHYSICAL ASSESSMENT
COMPLETED AND CHARTED. PT ON RA WITH 96% O2 SAT. PT TRACING SR ON TELE. PT UP
ADLIB TO RESTROOM. PT C/O OF CHRONIC BACK PAIN-PAIN MEDS GIVEN PER MAR. POST
CATH SITE TO RIGHT WRIST & RIGHT GROIN C/D/I. NO BLEEDING OR HEMATOMA NOTED.
PT RESTED WELL ON BED. CALL LIGHT WITHIN REACH. BED IN LOW POSITION.

## 2019-01-29 NOTE — EKG
[place] [address]
Phone:  [phone]                     ELECTROCARDIOGRAM REPORT      
_______________________________________________________________________________
 
Name:       ROBERT LONDON                 Room:           [address]    DIS [place]  
M.R.#:  [id number]      Account #:      [id number]  
Admission:  19     Attend Phys:    Conner Rodas MD, F
Discharge:  19     Date of Birth:  [birth year]  
         Report #: [id number]
    [id number]
_______________________________________________________________________________
THIS REPORT FOR:  //name//                      
 
                          [facility]
                                       
Test Date:    2019               Test Time:    07:54:04
Pat Name:     ROBERT LONDON             Department:   
Patient ID:   [id number]            Room:         [address]
Gender:       F                        Technician:   
:          1975               Requested By: Conner Rodas
Order Number: [id number]    Farideh MD:   Conner Rodas
                                 Measurements
Intervals                              Axis          
Rate:         73                       P:            43
CT:           154                      QRS:          61
QRSD:         87                       T:            26
QT:           389                                    
QTc:          429                                    
                           Interpretive Statements
Sinus rhythm
Compared to ECG 2019 12:47:48
No significant changes
 
Electronically Signed On 2019 15:00:42 CST by Conner Rodas
[email or web address]
 
 
 
 
 
 
 
 
 
 
 
 
 
 
 
 
 
 
 
  <ELECTRONICALLY SIGNED>
                                           By: Conner Rodas MD, [facility]      
  19     1500
D: 19 0754   _____________________________________
T: 19 0754   Conner Rodas MD, FACC        /EPI

## 2019-02-27 ENCOUNTER — HOSPITAL ENCOUNTER (OUTPATIENT)
Dept: HOSPITAL 96 - M.CRD | Age: 44
End: 2019-02-27
Attending: NURSE PRACTITIONER
Payer: COMMERCIAL

## 2019-02-27 DIAGNOSIS — I25.10: ICD-10-CM

## 2019-02-27 DIAGNOSIS — I34.0: Primary | ICD-10-CM

## 2019-02-27 NOTE — 2DMMODE
[place] [address]
Phone:  [phone] 2 D/M-MODE ECHOCARDIOGRAM     
_______________________________________________________________________________
 
Name:         ROBERT LONDON                Room:                     REG CLI
M.R.#:    [id number]     Account #:     [id number]  
Admission:    19    Attend Phys:   Kelsey Salcedo 
Discharge:                Date of Birth: [birth year]  
Date of Service: 19 1636  Report #:      [id number]
        [id number]
_______________________________________________________________________________
THIS REPORT FOR:  //name//                      
 
 
--------------- APPROVED REPORT --------------
 
 
Study performed:  2019 14:32:04
 
EXAM: Comprehensive 2D, Doppler, and color-flow 
Echocardiogram 
Patient Location: Out-Patient   
 
      BSA:         1.89
HR: 78 bpm BP:          118/58 mmHg 
 
Other Information 
Study Quality: Good
 
Indications
CAD
 
2D Dimensions
IVSd:  10.31 (7-11mm) LVOT Diam:  20.30 (18-24mm) 
LVDd:  46.79 mm  
PWd:  9.68 (7-11mm) Ascending Ao:  25.29 (22-36mm)
LVDs:  34.15 (25-40mm) 
Aortic Root:  21.21 mm 
 
Volumes
Left Atrial Volume (Systole) 
    LA ESV Index:  19.00 mL/m2
 
Aortic Valve
AoV Peak Saleem.:  1.48 m/s 
AO Peak Gr.:  8.75 mmHg  LVOT Max PG:  3.54 mmHg
AO Mean Gr.:  4.86 mmHg  LVOT Mean P.67 mmHg
    LVOT Max V:  0.94 m/s
AO V2 VTI:  29.37 cm  LVOT Mean V:  0.59 m/s
RACHEL (VTI):  2.11 cm2  LVOT V1 VTI:  19.14 cm
 
Mitral Valve
    E/A Ratio:  1.15
    MV Decel. Time:  229.99 ms
MV E Max Saleem.:  1.19 m/s 
MV PHT:  66.70 ms  
MVA (PHT):  3.30 cm2  
 
 
[place] [address]
Phone:  [phone]                     2 D/M-MODE ECHOCARDIOGRAM     
_______________________________________________________________________________
 
Name:         ROBERT LONDON                Room:                     REG CLI
M.R.#:    [id number]     Account #:     [id number]  
Admission:    19    Attend Phys:   Kelsey Salcedo 
Discharge:                Date of Birth: [birth year]  
Date of Service: 19 1636  Report #:      [id number]
        [id number]
_______________________________________________________________________________
 
TDI
E/Lateral E':  9.92 E/Medial E':  13.22
   Medial E' Saleem.:  0.09 m/s
   Lateral E' Saleem.:  0.12 m/s
 
Pulmonary Valve
PV Peak Saleem.:  0.81 m/s PV Peak Gr.:  2.62 mmHg
 
Tricuspid Valve
    RAP Estimate:  5.00 mmHg
TR Peak Gr.:  17.47 mmHg RVSP:  22.47 mmHg
    PA Pressure:  22.47 mmHg
 
Left Ventricle
The left ventricle is normal size. There is normal LV segmental wall 
motion. There is normal left ventricular wall thickness. Left 
ventricular systolic function is normal. The left ventricular 
ejection fraction is within the normal range. LVEF is 55-60%. The 
left ventricular diastolic function is normal.
 
Right Ventricle
The right ventricle is normal size. The right ventricular systolic 
function is normal.
 
Atria
The left atrium size is normal. The right atrium size is 
normal.
 
Aortic Valve
The aortic valve is normal in structure. No aortic regurgitation is 
present. There is no aortic valvular stenosis.
 
Mitral Valve
The mitral valve is normal in structure. Mild mitral regurgitation. 
No evidence of mitral valve stenosis.
 
Tricuspid Valve
The tricuspid valve is normal in structure. Trace to mild tricuspid 
regurgitation.
 
Pulmonic Valve
The pulmonary valve is normal in structure. There is no pulmonic 
valvular regurgitation.
 
[facility]
 
 
[place] [address]
Phone:  [phone] 2 D/M-MODE ECHOCARDIOGRAM     
_______________________________________________________________________________
 
Name:         ROBERT LONDON                Room:                     REG CLI
M.R.#:    [id number]     Account #:     [id number]  
Admission:    19    Attend Phys:   Kelsey Salcedo 
Discharge:                Date of Birth: [birth year]  
Date of Service: 19 1636  Report #:      [id number]
        [id number]
_______________________________________________________________________________
The aortic root is normal in size. IVC is normal in size and 
collapses >50% with inspiration.
 
Pericardium
There is no pericardial effusion.
 
<Conclusion>
Left ventricular systolic function is normal.
The left ventricular ejection fraction is within the normal 
range.
Mild mitral regurgitation.
 
 
 
 
 
 
 
 
 
 
 
 
 
 
 
 
 
 
 
 
 
 
 
 
 
 
 
 
 
 
 
 
 
  <ELECTRONICALLY SIGNED>
                                           By: Conner Rodas MD, FACC      
  19     1636
D: 19 163   _____________________________________
T: 19   Conner Rodas MD, FACC        /INF

## 2019-11-25 ENCOUNTER — HOSPITAL ENCOUNTER (OUTPATIENT)
Dept: HOSPITAL 96 - M.CRD | Age: 44
End: 2019-11-25
Attending: INTERNAL MEDICINE
Payer: COMMERCIAL

## 2019-11-25 DIAGNOSIS — I25.10: Primary | ICD-10-CM

## 2019-11-25 DIAGNOSIS — I21.4: ICD-10-CM

## 2019-11-25 NOTE — EXE
[place] [address]
Phone:  [phone]                     STRESS ECHOCARDIOGRAM         
_______________________________________________________________________________
 
Name:         LONDONROBERT R                Room:                     REG CLI
M.R.#:    [id number]     Account #:     [id number]  
Admission:    11/25/19    Attend Phys:   Conner Rodas MD
Discharge:                Date of Birth: [birth year]  
Date of Service: 11/25/19 1617  Report #:      [id number]
        [id number]
_______________________________________________________________________________
THIS REPORT FOR:  //name//                      
 
 
--------------- APPROVED REPORT --------------
 
 
Study performed:  11/25/2019 15:10:40
 
Exam:  Stress Echocardiogram
Indication: Chest pain
Patient Location: Out-Patient
Stress Nurse: Lauryn Brar RN
Supervising Physician: Conner Rodas MD
 
Ht: 5 ft 1 in      
HR: 78 bpm       BP: 113/63 mmHg 
 
Medical History
Cardiac Risk Factors: HTN, Hyperlipidemia, Tobacco History 
(Current/Recent), FHX of CAD
 
Procedure
The patient underwent an Exercise Stress Test using the [employer] 
Protocol. Blood pressure, heart rate, and EKG were monitored.
An Echocardiogram was performed by technician in four stages in quad 
fashion.  At peak stress, four selected images were obtained and 
placed side by side with resting images for comparison.
 
Stress Test Details
Stress Test:  Exercise stress testing was performed using a [employer] 
protocol.      
HR           
Resting HR:            78 bpm   Max Heart Rate (APMHR): 174 bpm  
Max HR Achieved:  160 bpm   Target HR (85% APMHR): 147 bpm  
% of APMHR:         91         
Recovery HR:            98 bpm        
HR response to stress: Normal HR response to stress      
 
BP           
Resting BP:  113/63 mmHg        
Max BP:       217/97 mmHg        
Recovery BP:       114/61 mmHg        
BP response to stress: Normal blood pressure response to 
stress.      
ECG           
Resting ECG:  Sinus Rhythm        
 
 
[place] [address]
Phone:  [phone]                     STRESS ECHOCARDIOGRAM         
_______________________________________________________________________________
 
Name:         ROBERT LONDON                Room:                     REG CLI
M.R.#:    [id number]     Account #:     [id number]  
Admission:    11/25/19    Attend Phys:   Conner Rodas MD
Discharge:                Date of Birth: [birth year]  
Date of Service: 11/25/19 1617  Report #:      [id number]
        [id number]
_______________________________________________________________________________
Stress ECG:     Sinus Rhythm, nonspecific ST-T abnormalities      
ST Change: Upsloping ST depression       
Maximum ST Deviation: 0.5 mm        
Arrhythmia:    None         
Recovery ECG: Sinus Rhythm        
Recovery ST Change: Normal        
Recovery ST Deviation: 0 mm        
Recovery Arrhythmia: VPC        
 
Clinical
Reason for Termination: Completed protocol
Exercise duration: 5 min 59 sec
Highest Stage Achieved: Stage 2: 2.5 mph at 12% grade. 
Exercise capacity: 7.05 METs
 
Pre-Stress Echo
The resting Echocardiogram showed normal left ventricular 
contractility with an estimated Ejection Fraction of about 60-65%. 
 
Post-Stress Echo
The stress Echocardiogram showed normal left ventricular 
contractility with an estimated Ejection Fraction of about >70%. 
Compared to rest, there were no stress-induced wall motion 
abnormalities. 
 
Conclusion
Clinical Response:  Non-ischemic
Exercise Capacity:  Average
Stress ECG Response:  Indeterminant
Stress Echo Images:  Non-ischemic
low risk stress echo for predicting future cardiac events
 
Other Information
Study Quality: Good
 
<Conclusion>
low risk stress echo for predicting future cardiac events
 
 
 
 
 
 
 
  <ELECTRONICALLY SIGNED>
                                           By: Conner Rodas MD, FACC      
  11/25/19 1617
D: 11/25/19 1617   _____________________________________
T: 11/25/19 1617   Conner Rodas MD, FACC        /INF